# Patient Record
Sex: FEMALE | Race: OTHER | HISPANIC OR LATINO
[De-identification: names, ages, dates, MRNs, and addresses within clinical notes are randomized per-mention and may not be internally consistent; named-entity substitution may affect disease eponyms.]

---

## 2019-09-10 ENCOUNTER — TRANSCRIPTION ENCOUNTER (OUTPATIENT)
Age: 34
End: 2019-09-10

## 2019-09-11 ENCOUNTER — INPATIENT (INPATIENT)
Facility: HOSPITAL | Age: 34
LOS: 2 days | Discharge: HOME | DRG: 854 | End: 2019-09-14
Attending: SURGERY | Admitting: STUDENT IN AN ORGANIZED HEALTH CARE EDUCATION/TRAINING PROGRAM
Payer: SELF-PAY

## 2019-09-11 ENCOUNTER — RESULT REVIEW (OUTPATIENT)
Age: 34
End: 2019-09-11

## 2019-09-11 VITALS
DIASTOLIC BLOOD PRESSURE: 64 MMHG | TEMPERATURE: 98 F | RESPIRATION RATE: 20 BRPM | HEART RATE: 120 BPM | SYSTOLIC BLOOD PRESSURE: 105 MMHG | WEIGHT: 175.05 LBS | HEIGHT: 61 IN | OXYGEN SATURATION: 100 %

## 2019-09-11 DIAGNOSIS — K37 UNSPECIFIED APPENDICITIS: ICD-10-CM

## 2019-09-11 LAB
ALBUMIN SERPL ELPH-MCNC: 4.5 G/DL — SIGNIFICANT CHANGE UP (ref 3.3–5.2)
ALP SERPL-CCNC: 67 U/L — SIGNIFICANT CHANGE UP (ref 40–120)
ALT FLD-CCNC: 14 U/L — SIGNIFICANT CHANGE UP
ANION GAP SERPL CALC-SCNC: 15 MMOL/L — SIGNIFICANT CHANGE UP (ref 5–17)
ANISOCYTOSIS BLD QL: SIGNIFICANT CHANGE UP
APPEARANCE UR: CLEAR — SIGNIFICANT CHANGE UP
AST SERPL-CCNC: 16 U/L — SIGNIFICANT CHANGE UP
BACTERIA # UR AUTO: NEGATIVE — SIGNIFICANT CHANGE UP
BASOPHILS # BLD AUTO: 0 K/UL — SIGNIFICANT CHANGE UP (ref 0–0.2)
BASOPHILS NFR BLD AUTO: 0 % — SIGNIFICANT CHANGE UP (ref 0–2)
BILIRUB SERPL-MCNC: 0.4 MG/DL — SIGNIFICANT CHANGE UP (ref 0.4–2)
BILIRUB UR-MCNC: NEGATIVE — SIGNIFICANT CHANGE UP
BLD GP AB SCN SERPL QL: SIGNIFICANT CHANGE UP
BUN SERPL-MCNC: 9 MG/DL — SIGNIFICANT CHANGE UP (ref 8–20)
CALCIUM SERPL-MCNC: 9 MG/DL — SIGNIFICANT CHANGE UP (ref 8.6–10.2)
CHLORIDE SERPL-SCNC: 101 MMOL/L — SIGNIFICANT CHANGE UP (ref 98–107)
CO2 SERPL-SCNC: 21 MMOL/L — LOW (ref 22–29)
COLOR SPEC: YELLOW — SIGNIFICANT CHANGE UP
CREAT SERPL-MCNC: 0.5 MG/DL — SIGNIFICANT CHANGE UP (ref 0.5–1.3)
DACRYOCYTES BLD QL SMEAR: SLIGHT — SIGNIFICANT CHANGE UP
DIFF PNL FLD: NEGATIVE — SIGNIFICANT CHANGE UP
ELLIPTOCYTES BLD QL SMEAR: SLIGHT — SIGNIFICANT CHANGE UP
EOSINOPHIL # BLD AUTO: 0 K/UL — SIGNIFICANT CHANGE UP (ref 0–0.5)
EOSINOPHIL NFR BLD AUTO: 0 % — SIGNIFICANT CHANGE UP (ref 0–6)
EPI CELLS # UR: SIGNIFICANT CHANGE UP
GIANT PLATELETS BLD QL SMEAR: PRESENT — SIGNIFICANT CHANGE UP
GLUCOSE SERPL-MCNC: 122 MG/DL — HIGH (ref 70–115)
GLUCOSE UR QL: NEGATIVE MG/DL — SIGNIFICANT CHANGE UP
HCG SERPL-ACNC: <4 MIU/ML — SIGNIFICANT CHANGE UP
HCT VFR BLD CALC: 30.9 % — LOW (ref 34.5–45)
HGB BLD-MCNC: 8.7 G/DL — LOW (ref 11.5–15.5)
HYPOCHROMIA BLD QL: SIGNIFICANT CHANGE UP
KETONES UR-MCNC: ABNORMAL
LEUKOCYTE ESTERASE UR-ACNC: ABNORMAL
LIDOCAIN IGE QN: 25 U/L — SIGNIFICANT CHANGE UP (ref 22–51)
LYMPHOCYTES # BLD AUTO: 1.46 K/UL — SIGNIFICANT CHANGE UP (ref 1–3.3)
LYMPHOCYTES # BLD AUTO: 7.8 % — LOW (ref 13–44)
MANUAL SMEAR VERIFICATION: SIGNIFICANT CHANGE UP
MCHC RBC-ENTMCNC: 17.2 PG — LOW (ref 27–34)
MCHC RBC-ENTMCNC: 28.2 GM/DL — LOW (ref 32–36)
MCV RBC AUTO: 61.1 FL — LOW (ref 80–100)
MICROCYTES BLD QL: SIGNIFICANT CHANGE UP
MONOCYTES # BLD AUTO: 0.17 K/UL — SIGNIFICANT CHANGE UP (ref 0–0.9)
MONOCYTES NFR BLD AUTO: 0.9 % — LOW (ref 2–14)
MYELOCYTES NFR BLD: 0.9 % — HIGH (ref 0–0)
NEUTROPHILS # BLD AUTO: 16.94 K/UL — HIGH (ref 1.8–7.4)
NEUTROPHILS NFR BLD AUTO: 89.5 % — HIGH (ref 43–77)
NEUTS BAND # BLD: 0.9 % — SIGNIFICANT CHANGE UP (ref 0–8)
NITRITE UR-MCNC: NEGATIVE — SIGNIFICANT CHANGE UP
OVALOCYTES BLD QL SMEAR: SLIGHT — SIGNIFICANT CHANGE UP
PH UR: 6.5 — SIGNIFICANT CHANGE UP (ref 5–8)
PLAT MORPH BLD: ABNORMAL
PLATELET # BLD AUTO: 282 K/UL — SIGNIFICANT CHANGE UP (ref 150–400)
POIKILOCYTOSIS BLD QL AUTO: SLIGHT — SIGNIFICANT CHANGE UP
POLYCHROMASIA BLD QL SMEAR: SLIGHT — SIGNIFICANT CHANGE UP
POTASSIUM SERPL-MCNC: 3.7 MMOL/L — SIGNIFICANT CHANGE UP (ref 3.5–5.3)
POTASSIUM SERPL-SCNC: 3.7 MMOL/L — SIGNIFICANT CHANGE UP (ref 3.5–5.3)
PROT SERPL-MCNC: 7.9 G/DL — SIGNIFICANT CHANGE UP (ref 6.6–8.7)
PROT UR-MCNC: 30 MG/DL
RBC # BLD: 5.06 M/UL — SIGNIFICANT CHANGE UP (ref 3.8–5.2)
RBC # FLD: 19.4 % — HIGH (ref 10.3–14.5)
RBC BLD AUTO: ABNORMAL
RBC CASTS # UR COMP ASSIST: SIGNIFICANT CHANGE UP /HPF (ref 0–4)
SCHISTOCYTES BLD QL AUTO: SLIGHT — SIGNIFICANT CHANGE UP
SODIUM SERPL-SCNC: 137 MMOL/L — SIGNIFICANT CHANGE UP (ref 135–145)
SP GR SPEC: 1.01 — SIGNIFICANT CHANGE UP (ref 1.01–1.02)
UROBILINOGEN FLD QL: 1 MG/DL
WBC # BLD: 18.74 K/UL — HIGH (ref 3.8–10.5)
WBC # FLD AUTO: 18.74 K/UL — HIGH (ref 3.8–10.5)
WBC UR QL: SIGNIFICANT CHANGE UP

## 2019-09-11 PROCEDURE — 99285 EMERGENCY DEPT VISIT HI MDM: CPT

## 2019-09-11 PROCEDURE — 88304 TISSUE EXAM BY PATHOLOGIST: CPT | Mod: 26

## 2019-09-11 PROCEDURE — 74177 CT ABD & PELVIS W/CONTRAST: CPT | Mod: 26

## 2019-09-11 PROCEDURE — 71045 X-RAY EXAM CHEST 1 VIEW: CPT | Mod: 26

## 2019-09-11 PROCEDURE — 76705 ECHO EXAM OF ABDOMEN: CPT | Mod: 26

## 2019-09-11 PROCEDURE — 44970 LAPAROSCOPY APPENDECTOMY: CPT

## 2019-09-11 PROCEDURE — 99222 1ST HOSP IP/OBS MODERATE 55: CPT

## 2019-09-11 RX ORDER — PIPERACILLIN AND TAZOBACTAM 4; .5 G/20ML; G/20ML
3.38 INJECTION, POWDER, LYOPHILIZED, FOR SOLUTION INTRAVENOUS ONCE
Refills: 0 | Status: COMPLETED | OUTPATIENT
Start: 2019-09-11 | End: 2019-09-11

## 2019-09-11 RX ORDER — PIPERACILLIN AND TAZOBACTAM 4; .5 G/20ML; G/20ML
3.38 INJECTION, POWDER, LYOPHILIZED, FOR SOLUTION INTRAVENOUS ONCE
Refills: 0 | Status: DISCONTINUED | OUTPATIENT
Start: 2019-09-11 | End: 2019-09-11

## 2019-09-11 RX ORDER — BENZOCAINE AND MENTHOL 5; 1 G/100ML; G/100ML
1 LIQUID ORAL ONCE
Refills: 0 | Status: COMPLETED | OUTPATIENT
Start: 2019-09-11 | End: 2019-09-11

## 2019-09-11 RX ORDER — MORPHINE SULFATE 50 MG/1
4 CAPSULE, EXTENDED RELEASE ORAL ONCE
Refills: 0 | Status: DISCONTINUED | OUTPATIENT
Start: 2019-09-11 | End: 2019-09-11

## 2019-09-11 RX ORDER — SODIUM CHLORIDE 9 MG/ML
1000 INJECTION, SOLUTION INTRAVENOUS ONCE
Refills: 0 | Status: COMPLETED | OUTPATIENT
Start: 2019-09-11 | End: 2019-09-11

## 2019-09-11 RX ORDER — MORPHINE SULFATE 50 MG/1
2 CAPSULE, EXTENDED RELEASE ORAL EVERY 4 HOURS
Refills: 0 | Status: DISCONTINUED | OUTPATIENT
Start: 2019-09-11 | End: 2019-09-11

## 2019-09-11 RX ORDER — SODIUM CHLORIDE 9 MG/ML
1000 INJECTION INTRAMUSCULAR; INTRAVENOUS; SUBCUTANEOUS ONCE
Refills: 0 | Status: COMPLETED | OUTPATIENT
Start: 2019-09-11 | End: 2019-09-11

## 2019-09-11 RX ORDER — ACETAMINOPHEN 500 MG
1000 TABLET ORAL ONCE
Refills: 0 | Status: COMPLETED | OUTPATIENT
Start: 2019-09-11 | End: 2019-09-11

## 2019-09-11 RX ORDER — OXYCODONE HYDROCHLORIDE 5 MG/1
5 TABLET ORAL EVERY 6 HOURS
Refills: 0 | Status: DISCONTINUED | OUTPATIENT
Start: 2019-09-11 | End: 2019-09-14

## 2019-09-11 RX ORDER — SODIUM CHLORIDE 9 MG/ML
1000 INJECTION, SOLUTION INTRAVENOUS
Refills: 0 | Status: DISCONTINUED | OUTPATIENT
Start: 2019-09-11 | End: 2019-09-11

## 2019-09-11 RX ORDER — ONDANSETRON 8 MG/1
4 TABLET, FILM COATED ORAL ONCE
Refills: 0 | Status: DISCONTINUED | OUTPATIENT
Start: 2019-09-11 | End: 2019-09-11

## 2019-09-11 RX ORDER — ENOXAPARIN SODIUM 100 MG/ML
40 INJECTION SUBCUTANEOUS DAILY
Refills: 0 | Status: CANCELLED | OUTPATIENT
Start: 2019-09-12 | End: 2019-09-11

## 2019-09-11 RX ORDER — FENTANYL CITRATE 50 UG/ML
50 INJECTION INTRAVENOUS
Refills: 0 | Status: DISCONTINUED | OUTPATIENT
Start: 2019-09-11 | End: 2019-09-11

## 2019-09-11 RX ORDER — ONDANSETRON 8 MG/1
4 TABLET, FILM COATED ORAL ONCE
Refills: 0 | Status: COMPLETED | OUTPATIENT
Start: 2019-09-11 | End: 2019-09-11

## 2019-09-11 RX ORDER — PIPERACILLIN AND TAZOBACTAM 4; .5 G/20ML; G/20ML
3.38 INJECTION, POWDER, LYOPHILIZED, FOR SOLUTION INTRAVENOUS EVERY 8 HOURS
Refills: 0 | Status: DISCONTINUED | OUTPATIENT
Start: 2019-09-11 | End: 2019-09-11

## 2019-09-11 RX ORDER — ACETAMINOPHEN 500 MG
650 TABLET ORAL EVERY 6 HOURS
Refills: 0 | Status: DISCONTINUED | OUTPATIENT
Start: 2019-09-11 | End: 2019-09-14

## 2019-09-11 RX ORDER — KETOROLAC TROMETHAMINE 30 MG/ML
30 SYRINGE (ML) INJECTION EVERY 8 HOURS
Refills: 0 | Status: DISCONTINUED | OUTPATIENT
Start: 2019-09-11 | End: 2019-09-12

## 2019-09-11 RX ORDER — HEPARIN SODIUM 5000 [USP'U]/ML
5000 INJECTION INTRAVENOUS; SUBCUTANEOUS EVERY 8 HOURS
Refills: 0 | Status: DISCONTINUED | OUTPATIENT
Start: 2019-09-12 | End: 2019-09-14

## 2019-09-11 RX ADMIN — SODIUM CHLORIDE 1000 MILLILITER(S): 9 INJECTION INTRAMUSCULAR; INTRAVENOUS; SUBCUTANEOUS at 09:26

## 2019-09-11 RX ADMIN — OXYCODONE HYDROCHLORIDE 5 MILLIGRAM(S): 5 TABLET ORAL at 22:52

## 2019-09-11 RX ADMIN — MORPHINE SULFATE 4 MILLIGRAM(S): 50 CAPSULE, EXTENDED RELEASE ORAL at 09:26

## 2019-09-11 RX ADMIN — OXYCODONE HYDROCHLORIDE 5 MILLIGRAM(S): 5 TABLET ORAL at 23:22

## 2019-09-11 RX ADMIN — MORPHINE SULFATE 2 MILLIGRAM(S): 50 CAPSULE, EXTENDED RELEASE ORAL at 17:00

## 2019-09-11 RX ADMIN — MORPHINE SULFATE 2 MILLIGRAM(S): 50 CAPSULE, EXTENDED RELEASE ORAL at 16:25

## 2019-09-11 RX ADMIN — SODIUM CHLORIDE 2000 MILLILITER(S): 9 INJECTION, SOLUTION INTRAVENOUS at 16:26

## 2019-09-11 RX ADMIN — ONDANSETRON 4 MILLIGRAM(S): 8 TABLET, FILM COATED ORAL at 09:25

## 2019-09-11 RX ADMIN — Medication 650 MILLIGRAM(S): at 23:23

## 2019-09-11 RX ADMIN — Medication 30 MILLIGRAM(S): at 22:07

## 2019-09-11 RX ADMIN — SODIUM CHLORIDE 1000 MILLILITER(S): 9 INJECTION INTRAMUSCULAR; INTRAVENOUS; SUBCUTANEOUS at 10:26

## 2019-09-11 RX ADMIN — Medication 400 MILLIGRAM(S): at 15:25

## 2019-09-11 RX ADMIN — Medication 650 MILLIGRAM(S): at 22:53

## 2019-09-11 RX ADMIN — BENZOCAINE AND MENTHOL 1 LOZENGE: 5; 1 LIQUID ORAL at 23:24

## 2019-09-11 RX ADMIN — PIPERACILLIN AND TAZOBACTAM 200 GRAM(S): 4; .5 INJECTION, POWDER, LYOPHILIZED, FOR SOLUTION INTRAVENOUS at 15:25

## 2019-09-11 NOTE — ED STATDOCS - ATTENDING CONTRIBUTION TO CARE
I, Say Leon, performed the initial face to face bedside interview with this patient regarding history of present illness, review of symptoms and relevant past medical, social and family history.  I completed an independent physical examination.  I was the initial provider who evaluated this patient. I have signed out the follow up of any pending tests (i.e. labs, radiological studies) to the ACP.  I have communicated the patient’s plan of care and disposition with the ACP.  The history, relevant review of systems, past medical and surgical history, medical decision making, and physical examination was documented by the scribe in my presence and I attest to the accuracy of the documentation.

## 2019-09-11 NOTE — H&P ADULT - ASSESSMENT
33yo female with acute appendicitis. Concern for possible acute cholecystitis? biliary colic? symptomatich cholelithiasis?  - Admit to ACS  - NPO/IVF's  - Zosyn IV abx  - OR add on

## 2019-09-11 NOTE — H&P ADULT - ATTENDING COMMENTS
one day of abd pain woke her up at 0400, +n/v no fever . pain to epigastrum ruq / rlq / suprapubic .  abd soft ttp to ruq, rlq, suprapubic.   labs reviewed wbc 18K, lft's wnl  ct reviewed  acute appendicitis , u/s gb with gs no dilation , no gbw thickening but does have a dilated cbd 8mm.  plan  admit to acs  start zosyn  d/w pt risks benefits alternatives for appendectomy , she agrees for surgery.  plan for surgery today.

## 2019-09-11 NOTE — H&P ADULT - NSHPPHYSICALEXAM_GEN_ALL_CORE
PHYSICAL EXAM:  GENERAL: NAD, lying in bed comfortably  EYES: EOMI, PERRLA  CHEST/LUNG: Clear to auscultation bilaterally  HEART: Regular rate and rhythm  ABDOMEN: soft, exquisitely tender epigastric, RUQ and RLQ, rebound tenderness to RLQ, (+) Pena  EXTREMITIES:  2+ Peripheral Pulses, brisk capillary refill  NERVOUS SYSTEM:  Alert & Oriented X3  MSK: FROM all 4 extremities, full and equal strength

## 2019-09-11 NOTE — PROGRESS NOTE ADULT - ASSESSMENT
Otherwise healthy 34 F with acute appendicitics possible concurrent cholecystitis who is now s/p lap appy 9/10. Tolerated surgery well, doing well post-operatively.    -Continue Pain control  - DASH diet  - SQH  - AM labs  - no ABx at this time, assess for concurrent cholecystitis based on clinical presentation  - SQH

## 2019-09-11 NOTE — H&P ADULT - NSHPLABSRESULTS_GEN_ALL_CORE
< from: US Gallbladder (09.11.19 @ 10:40) >   EXAM:  US GALLBLADDER                        PROCEDURE DATE:  09/11/2019      INTERPRETATION:  US GALLBLADDER    History: Right upper quadrant pain.    Technique: Ultrasound of the gallbladder.    Comparison:  None.    Findings:    There is cholelithiasis. Gallbladder wall borderline caliber (3-4 mm). No   pericholecystic fluid. Patient tender over the gallbladder at the time of   the study.    Dilated common bile duct measuring 8-9 mm.    Impression:  Cholelithiasis; patient tender over the gallbladder at the time of the   study; clinical correlation is recommended for cholecystitis; a HIDA scan   could be obtained for further evaluation.  Dilated common bile duct measuring 8-9 mm; a noncontrast MRI/MRCP is   recommended for further evaluation.    ERIBERTO BRYAN   This document has been electronically signed. Sep 11 2019 10:54AM  < end of copied text >    < from: CT Abdomen and Pelvis w/ IV Cont (09.11.19 @ 12:54) >     EXAM:  CT ABDOMEN AND PELVIS IC                          PROCEDURE DATE:  09/11/2019      INTERPRETATION:  CLINICAL INFORMATION: Abdominal pain.    COMPARISON: Gallbladder ultrasound of the same day.    PROCEDURE:   CT of the Abdomen and Pelvis was performed with intravenous contrast.   Intravenous contrast: 93 ml Omnipaque 300.  Oral contrast: None.  Sagittal and coronal reformats were performed.    FINDINGS:    LOWER CHEST: Bibasilar opacities, likely atelectasis.    LIVER: Low in attenuation, likely fatty infiltration.  BILE DUCTS: Dilated common bile duct measuring 9 mm.  GALLBLADDER: Mildly distended. Normal caliber wall. No pericholecystic   inflammatory change. Gallstones on the ultrasound of the same day are not   seen on CT.  SPLEEN: Within normal limits.  PANCREAS: Within normal limits.  ADRENALS: Within normal limits.  KIDNEYS/URETERS: Within normal limits.    BLADDER: Within normal limits.  REPRODUCTIVE ORGANS: Uterus and adnexa within normal limits.    BOWEL: No bowel obstruction. Dilated appendix measuring 1.2 cm with   infiltration of the fat adjacent to the distal appendix consistent with   appendicitis.  PERITONEUM: Small amount of fluid/infiltration of the fat adjacent to the   appendix. No free air. No abscess or free air.  VESSELS: Abdominal aorta normal in caliber. Retroaortic left renal vein,   an anatomic variant.  RETROPERITONEUM/LYMPH NODES: No lymphadenopathy.    ABDOMINAL WALL: Within normal limits.  BONES: Sclerosis involving both sacroiliac joints.    IMPRESSION:     Appendicitis.    Mildly dilated common bile duct measuring 9 mm; correlation is   recommended with LFTs; a noncontrast MRI/MRCP of the abdomen is   recommended for further evaluation.    The findings were discussed with Dr. Leon at 1:12 PM on 9/11/2019.    ERIBERTO BRYAN   This document has been electronically signed. Sep 11 2019  1:14  < end of copied text >

## 2019-09-11 NOTE — ED STATDOCS - PROGRESS NOTE DETAILS
AMARILIS HERNANDEZ: PT evaluated by intake physician. HPI/PE/ROS as noted above. Will follow up plan per intake physician   diffuse abdominal pain. diffusely ttp.   pending ct appendicitis on ct with gallstones and dilated cbd   surgery consulted

## 2019-09-11 NOTE — H&P ADULT - HISTORY OF PRESENT ILLNESS
35yo female presents to the ED with complaint of epigastric pain that radiated to her RUQ and RLQ. Pain started around 4am today, sharp, constant, no aggravating or alleviating factors. had some associated nausea with x3 emesis NBNB. Last BM was yesterday, has lost appetitis and not eaten since yesteray. Currently, denies chest pain, SOB, fevers, chills, dysuria.

## 2019-09-11 NOTE — ED ADULT NURSE REASSESSMENT NOTE - NS ED NURSE REASSESS COMMENT FT1
Upon reassessment, pt found to be afebrile  an tachycardic.  respirations even and unlabored, no signs of acute distress.  ACS/trauma made aware of pts fever/tachycardia, will order IVF and tylenol.

## 2019-09-11 NOTE — PROGRESS NOTE ADULT - SUBJECTIVE AND OBJECTIVE BOX
INTERVAL HPI/OVERNIGHT EVENTS:    S/p Lap Appy . tolerated surgery well, recovering well on the floors. No complaints at this time. pain is decreased from before. Tolerating diet. urinating on her own. No f/c, cp,sob    MEDICATIONS  (STANDING):  acetaminophen   Tablet .. 650 milliGRAM(s) Oral every 6 hours  ketorolac   Injectable 30 milliGRAM(s) IV Push every 8 hours    MEDICATIONS  (PRN):  oxyCODONE    IR 5 milliGRAM(s) Oral every 6 hours PRN Severe Pain (7 - 10)      Vital Signs Last 24 Hrs  T(C): 37.1 (11 Sep 2019 23:23), Max: 38.7 (11 Sep 2019 14:59)  T(F): 98.8 (11 Sep 2019 23:23), Max: 101.6 (11 Sep 2019 14:59)  HR: 116 (11 Sep 2019 23:23) (95 - 132)  BP: 100/66 (11 Sep 2019 23:23) (96/61 - 116/70)  BP(mean): --  RR: 18 (11 Sep 2019 23:23) (14 - 20)  SpO2: 95% (11 Sep 2019 23:23) (95% - 100%)    Physical Exam:    Neurological:  No sensory/motor deficits    HEENT: PERRLA, EOMI, no drainage or redness    Respiratory: Breath Sounds equal & clear to auscultation, no accessory muscle use    Cardiovascular: Regular rate & rhythm, normal S1, S2; no murmurs, gallops or rubs    Gastrointestinal: Soft, appropriately tender, non-peritonitic, surgical sites c/d/i    Extremities: No peripheral edema, No cyanosis, clubbing     Vascular: Equal and normal pulses: 2+ peripheral pulses throughout    Musculoskeletal: No joint pain, swelling or deformity; no limitation of movement    Skin: No rashes      I&O's Detail    11 Sep 2019 07:01  -  11 Sep 2019 23:53  --------------------------------------------------------  IN:    lactated ringers.: 50 mL  Total IN: 50 mL    OUT:    Voided: 425 mL  Total OUT: 425 mL    Total NET: -375 mL          LABS:                        8.7    18.74 )-----------( 282      ( 11 Sep 2019 09:47 )             30.9         137  |  101  |  9.0  ----------------------------<  122<H>  3.7   |  21.0<L>  |  0.50    Ca    9.0      11 Sep 2019 09:47    TPro  7.9  /  Alb  4.5  /  TBili  0.4  /  DBili  x   /  AST  16  /  ALT  14  /  AlkPhos  67  09-11      Urinalysis Basic - ( 11 Sep 2019 10:11 )    Color: Yellow / Appearance: Clear / S.010 / pH: x  Gluc: x / Ketone: Trace  / Bili: Negative / Urobili: 1 mg/dL   Blood: x / Protein: 30 mg/dL / Nitrite: Negative   Leuk Esterase: Trace / RBC: 0-2 /HPF / WBC 3-5   Sq Epi: x / Non Sq Epi: Occasional / Bacteria: Negative        RADIOLOGY & ADDITIONAL STUDIES:

## 2019-09-11 NOTE — ED STATDOCS - OBJECTIVE STATEMENT
33 y/o F pt with no PMHx presents to the ED c/o abdominal pain. Abdominal pain is described as diffuse and crampy. Starting at epigastric and radiating to generalization. Nausea, vomiting, no diarrhea. No recent travel or sick contacts. Denies CP, SOB.

## 2019-09-12 ENCOUNTER — TRANSCRIPTION ENCOUNTER (OUTPATIENT)
Age: 34
End: 2019-09-12

## 2019-09-12 LAB
ABO RH CONFIRMATION: SIGNIFICANT CHANGE UP
ANION GAP SERPL CALC-SCNC: 10 MMOL/L — SIGNIFICANT CHANGE UP (ref 5–17)
BASOPHILS # BLD AUTO: 0.02 K/UL — SIGNIFICANT CHANGE UP (ref 0–0.2)
BASOPHILS NFR BLD AUTO: 0.1 % — SIGNIFICANT CHANGE UP (ref 0–2)
BUN SERPL-MCNC: 6 MG/DL — LOW (ref 8–20)
CALCIUM SERPL-MCNC: 8.5 MG/DL — LOW (ref 8.6–10.2)
CHLORIDE SERPL-SCNC: 105 MMOL/L — SIGNIFICANT CHANGE UP (ref 98–107)
CO2 SERPL-SCNC: 23 MMOL/L — SIGNIFICANT CHANGE UP (ref 22–29)
CREAT SERPL-MCNC: 0.4 MG/DL — LOW (ref 0.5–1.3)
EOSINOPHIL # BLD AUTO: 0 K/UL — SIGNIFICANT CHANGE UP (ref 0–0.5)
EOSINOPHIL NFR BLD AUTO: 0 % — SIGNIFICANT CHANGE UP (ref 0–6)
GLUCOSE SERPL-MCNC: 136 MG/DL — HIGH (ref 70–115)
HCT VFR BLD CALC: 23.2 % — LOW (ref 34.5–45)
HCT VFR BLD CALC: 24.5 % — LOW (ref 34.5–45)
HCT VFR BLD CALC: 26.2 % — LOW (ref 34.5–45)
HGB BLD-MCNC: 6.6 G/DL — CRITICAL LOW (ref 11.5–15.5)
HGB BLD-MCNC: 7 G/DL — CRITICAL LOW (ref 11.5–15.5)
HGB BLD-MCNC: 7.6 G/DL — LOW (ref 11.5–15.5)
IMM GRANULOCYTES NFR BLD AUTO: 0.8 % — SIGNIFICANT CHANGE UP (ref 0–1.5)
LYMPHOCYTES # BLD AUTO: 1.39 K/UL — SIGNIFICANT CHANGE UP (ref 1–3.3)
LYMPHOCYTES # BLD AUTO: 7 % — LOW (ref 13–44)
MAGNESIUM SERPL-MCNC: 1.8 MG/DL — SIGNIFICANT CHANGE UP (ref 1.6–2.6)
MCHC RBC-ENTMCNC: 17.1 PG — LOW (ref 27–34)
MCHC RBC-ENTMCNC: 17.2 PG — LOW (ref 27–34)
MCHC RBC-ENTMCNC: 18.2 PG — LOW (ref 27–34)
MCHC RBC-ENTMCNC: 28.4 GM/DL — LOW (ref 32–36)
MCHC RBC-ENTMCNC: 28.6 GM/DL — LOW (ref 32–36)
MCHC RBC-ENTMCNC: 29 GM/DL — LOW (ref 32–36)
MCV RBC AUTO: 59.9 FL — LOW (ref 80–100)
MCV RBC AUTO: 60 FL — LOW (ref 80–100)
MCV RBC AUTO: 62.7 FL — LOW (ref 80–100)
MONOCYTES # BLD AUTO: 1.07 K/UL — HIGH (ref 0–0.9)
MONOCYTES NFR BLD AUTO: 5.4 % — SIGNIFICANT CHANGE UP (ref 2–14)
NEUTROPHILS # BLD AUTO: 17.25 K/UL — HIGH (ref 1.8–7.4)
NEUTROPHILS NFR BLD AUTO: 86.7 % — HIGH (ref 43–77)
PHOSPHATE SERPL-MCNC: 2.4 MG/DL — SIGNIFICANT CHANGE UP (ref 2.4–4.7)
PLATELET # BLD AUTO: 228 K/UL — SIGNIFICANT CHANGE UP (ref 150–400)
PLATELET # BLD AUTO: 231 K/UL — SIGNIFICANT CHANGE UP (ref 150–400)
PLATELET # BLD AUTO: 242 K/UL — SIGNIFICANT CHANGE UP (ref 150–400)
POTASSIUM SERPL-MCNC: 3.5 MMOL/L — SIGNIFICANT CHANGE UP (ref 3.5–5.3)
POTASSIUM SERPL-SCNC: 3.5 MMOL/L — SIGNIFICANT CHANGE UP (ref 3.5–5.3)
RBC # BLD: 3.87 M/UL — SIGNIFICANT CHANGE UP (ref 3.8–5.2)
RBC # BLD: 4.08 M/UL — SIGNIFICANT CHANGE UP (ref 3.8–5.2)
RBC # BLD: 4.18 M/UL — SIGNIFICANT CHANGE UP (ref 3.8–5.2)
RBC # FLD: 19.1 % — HIGH (ref 10.3–14.5)
RBC # FLD: 19.2 % — HIGH (ref 10.3–14.5)
RBC # FLD: 21 % — HIGH (ref 10.3–14.5)
SODIUM SERPL-SCNC: 138 MMOL/L — SIGNIFICANT CHANGE UP (ref 135–145)
WBC # BLD: 18.15 K/UL — HIGH (ref 3.8–10.5)
WBC # BLD: 19.18 K/UL — HIGH (ref 3.8–10.5)
WBC # BLD: 19.89 K/UL — HIGH (ref 3.8–10.5)
WBC # FLD AUTO: 18.15 K/UL — HIGH (ref 3.8–10.5)
WBC # FLD AUTO: 19.18 K/UL — HIGH (ref 3.8–10.5)
WBC # FLD AUTO: 19.89 K/UL — HIGH (ref 3.8–10.5)

## 2019-09-12 PROCEDURE — 93010 ELECTROCARDIOGRAM REPORT: CPT

## 2019-09-12 RX ORDER — SODIUM,POTASSIUM PHOSPHATES 278-250MG
1 POWDER IN PACKET (EA) ORAL
Refills: 0 | Status: COMPLETED | OUTPATIENT
Start: 2019-09-12 | End: 2019-09-12

## 2019-09-12 RX ORDER — MAGNESIUM OXIDE 400 MG ORAL TABLET 241.3 MG
400 TABLET ORAL ONCE
Refills: 0 | Status: COMPLETED | OUTPATIENT
Start: 2019-09-12 | End: 2019-09-12

## 2019-09-12 RX ORDER — SODIUM CHLORIDE 9 MG/ML
1000 INJECTION, SOLUTION INTRAVENOUS ONCE
Refills: 0 | Status: COMPLETED | OUTPATIENT
Start: 2019-09-12 | End: 2019-09-12

## 2019-09-12 RX ORDER — INFLUENZA VIRUS VACCINE 15; 15; 15; 15 UG/.5ML; UG/.5ML; UG/.5ML; UG/.5ML
0.5 SUSPENSION INTRAMUSCULAR ONCE
Refills: 0 | Status: COMPLETED | OUTPATIENT
Start: 2019-09-12 | End: 2019-09-12

## 2019-09-12 RX ORDER — CEFEPIME 1 G/1
2000 INJECTION, POWDER, FOR SOLUTION INTRAMUSCULAR; INTRAVENOUS EVERY 12 HOURS
Refills: 0 | Status: COMPLETED | OUTPATIENT
Start: 2019-09-12 | End: 2019-09-13

## 2019-09-12 RX ADMIN — Medication 30 MILLIGRAM(S): at 14:40

## 2019-09-12 RX ADMIN — OXYCODONE HYDROCHLORIDE 5 MILLIGRAM(S): 5 TABLET ORAL at 11:40

## 2019-09-12 RX ADMIN — MAGNESIUM OXIDE 400 MG ORAL TABLET 400 MILLIGRAM(S): 241.3 TABLET ORAL at 14:27

## 2019-09-12 RX ADMIN — CEFEPIME 100 MILLIGRAM(S): 1 INJECTION, POWDER, FOR SOLUTION INTRAMUSCULAR; INTRAVENOUS at 17:26

## 2019-09-12 RX ADMIN — Medication 30 MILLIGRAM(S): at 05:10

## 2019-09-12 RX ADMIN — Medication 1 PACKET(S): at 14:28

## 2019-09-12 RX ADMIN — Medication 30 MILLIGRAM(S): at 14:27

## 2019-09-12 RX ADMIN — HEPARIN SODIUM 5000 UNIT(S): 5000 INJECTION INTRAVENOUS; SUBCUTANEOUS at 14:27

## 2019-09-12 RX ADMIN — HEPARIN SODIUM 5000 UNIT(S): 5000 INJECTION INTRAVENOUS; SUBCUTANEOUS at 21:08

## 2019-09-12 RX ADMIN — Medication 650 MILLIGRAM(S): at 17:53

## 2019-09-12 RX ADMIN — Medication 650 MILLIGRAM(S): at 17:26

## 2019-09-12 RX ADMIN — Medication 30 MILLIGRAM(S): at 21:23

## 2019-09-12 RX ADMIN — Medication 650 MILLIGRAM(S): at 05:10

## 2019-09-12 RX ADMIN — HEPARIN SODIUM 5000 UNIT(S): 5000 INJECTION INTRAVENOUS; SUBCUTANEOUS at 05:10

## 2019-09-12 RX ADMIN — Medication 650 MILLIGRAM(S): at 12:04

## 2019-09-12 RX ADMIN — Medication 1 PACKET(S): at 12:02

## 2019-09-12 RX ADMIN — Medication 30 MILLIGRAM(S): at 05:40

## 2019-09-12 RX ADMIN — Medication 650 MILLIGRAM(S): at 11:40

## 2019-09-12 RX ADMIN — OXYCODONE HYDROCHLORIDE 5 MILLIGRAM(S): 5 TABLET ORAL at 12:04

## 2019-09-12 RX ADMIN — Medication 1 PACKET(S): at 17:26

## 2019-09-12 RX ADMIN — Medication 650 MILLIGRAM(S): at 05:40

## 2019-09-12 RX ADMIN — SODIUM CHLORIDE 1000 MILLILITER(S): 9 INJECTION, SOLUTION INTRAVENOUS at 07:00

## 2019-09-12 RX ADMIN — Medication 30 MILLIGRAM(S): at 21:08

## 2019-09-12 NOTE — DISCHARGE NOTE PROVIDER - HOSPITAL COURSE
Admission HPI: 33yo female presents to the ED with complaint of epigastric pain that radiated to her RUQ and RLQ. Pain started around 4am today, sharp, constant, no aggravating or alleviating factors. had some associated nausea with x3 emesis NBNB. Last BM was yesterday, has lost appetite and not eaten since yesterday. Currently, denies chest pain, SOB, fevers, chills, dysuria.         Hospital Course: GB ultrasound on admission showed cholelithiasis; patient tender over the gallbladder at the time of the study. CT AP showed appendicitis w/ mildly dilated common bile duct measuring 9 mm. LFTs on admission WNL. Patient was admitted to the ACS service & taken to the OR on 9/11 - intraop findings = acutely iflammed appendix w/ surrounding inflammation and purulence. Patient tolerated procedure well. Post-op tolerating diet, pain controlled, ambulating & voiding.        Patient is advised to RETURN TO THE EMERGENCY DEPARTMENT for any of the following - worsening pain, fever/chills, nausea/vomiting, altered mental status, chest pain, shortness of breath, or any other new / worsening symptom. Admission HPI: 35yo female presents to the ED with complaint of epigastric pain that radiated to her RUQ and RLQ. Pain started around 4am today, sharp, constant, no aggravating or alleviating factors. had some associated nausea with x3 emesis NBNB. Last BM was yesterday, has lost appetite and not eaten since yesterday. Currently, denies chest pain, SOB, fevers, chills, dysuria.         Hospital Course: GB ultrasound on admission showed cholelithiasis; patient tender over the gallbladder at the time of the study. CT AP showed appendicitis w/ mildly dilated common bile duct measuring 9 mm. LFTs on admission WNL. Patient was admitted to hospital & taken to the OR on 9/11 - intraop findings = acutely inflamed appendix w/ surrounding inflammation and purulence. Patient tolerated procedure well. Patient consisted to be tachycardic to the low 100's although decreased from tachycardia on admission in the 130's. B/l LE duplex performed which revealed no evidence of DVT. Family Medicine was consulted to establish a primary care physician and was recommended a f/u outpatient appointment with Dr. Ngoc Goff who is [place] speaking. Post-op tolerating diet, pain controlled, ambulating & voiding.        Patient is advised to RETURN TO THE EMERGENCY DEPARTMENT for any of the following - worsening pain, fever/chills, nausea/vomiting, altered mental status, chest pain, shortness of breath, or any other new / worsening symptom.

## 2019-09-12 NOTE — PROGRESS NOTE ADULT - SUBJECTIVE AND OBJECTIVE BOX
Progress Note:   · Provider Specialty	Surgery	    Reason for Admission:    Reason for Admission:  · Reason for Admission	Acute appendicitis	      · Subjective and Objective: 	  INTERVAL HPI/OVERNIGHT EVENTS:    S/p Lap Appy . tolerated surgery well, recovering well on the floors. No complaints at this time. pain is decreased from before. Tolerating diet. urinating on her own. No f/c, cp,sob    MEDICATIONS  (STANDING):  acetaminophen   Tablet .. 650 milliGRAM(s) Oral every 6 hours  ketorolac   Injectable 30 milliGRAM(s) IV Push every 8 hours    MEDICATIONS  (PRN):  oxyCODONE    IR 5 milliGRAM(s) Oral every 6 hours PRN Severe Pain (7 - 10)      Vital Signs Last 24 Hrs  T(C): 37.1 (11 Sep 2019 23:23), Max: 38.7 (11 Sep 2019 14:59)  T(F): 98.8 (11 Sep 2019 23:23), Max: 101.6 (11 Sep 2019 14:59)  HR: 116 (11 Sep 2019 23:23) (95 - 132)  BP: 100/66 (11 Sep 2019 23:23) (96/61 - 116/70)  BP(mean): --  RR: 18 (11 Sep 2019 23:23) (14 - 20)  SpO2: 95% (11 Sep 2019 23:23) (95% - 100%)    Physical Exam:    Neurological:  No sensory/motor deficits    HEENT: PERRLA, EOMI, no drainage or redness    Respiratory: Breath Sounds equal & clear to auscultation, no accessory muscle use    Cardiovascular: Regular rate & rhythm, normal S1, S2; no murmurs, gallops or rubs    Gastrointestinal: Soft, appropriately tender, non-peritonitic, surgical sites c/d/i    Extremities: No peripheral edema, No cyanosis, clubbing     Vascular: Equal and normal pulses: 2+ peripheral pulses throughout    Musculoskeletal: No joint pain, swelling or deformity; no limitation of movement    Skin: No rashes      I&O's Detail    11 Sep 2019 07:01  -  11 Sep 2019 23:53  --------------------------------------------------------  IN:    lactated ringers.: 50 mL  Total IN: 50 mL    OUT:    Voided: 425 mL  Total OUT: 425 mL    Total NET: -375 mL          LABS:                        8.7    18.74 )-----------( 282      ( 11 Sep 2019 09:47 )             30.9     09-    137  |  101  |  9.0  ----------------------------<  122<H>  3.7   |  21.0<L>  |  0.50    Ca    9.0      11 Sep 2019 09:47    TPro  7.9  /  Alb  4.5  /  TBili  0.4  /  DBili  x   /  AST  16  /  ALT  14  /  AlkPhos  67        Urinalysis Basic - ( 11 Sep 2019 10:11 )    Color: Yellow / Appearance: Clear / S.010 / pH: x  Gluc: x / Ketone: Trace  / Bili: Negative / Urobili: 1 mg/dL   Blood: x / Protein: 30 mg/dL / Nitrite: Negative   Leuk Esterase: Trace / RBC: 0-2 /HPF / WBC 3-5   Sq Epi: x / Non Sq Epi: Occasional / Bacteria: Negative        RADIOLOGY & ADDITIONAL STUDIES:    Assessment and Plan:   · Assessment		  Otherwise healthy 34 F with acute appendicitics possible concurrent cholecystitis who is now s/p lap appy 9/10. Tolerated surgery well, doing well post-operatively.    -Continue Pain control  - DASH diet  - SQH  - AM labs  - no ABx at this time, assess for concurrent cholecystitis based on clinical presentation  - SQ

## 2019-09-12 NOTE — DISCHARGE NOTE PROVIDER - CARE PROVIDER_API CALL
[facility],   [facility] [address] [place] [address]  Phone: [phone]  Fax: (   )    -  Follow Up Time: Ngoc solo  Phone: [phone]  Fax: (   )    -  Follow Up Time: Ngoc solo  Phone: [phone]  Fax: (   )    -  Follow Up Time:     Adam Sparks  [address] [place] [id number]  Phone: [phone]  Fax: (   )    -  Follow Up Time:

## 2019-09-12 NOTE — DISCHARGE NOTE PROVIDER - PROVIDER TOKENS
FREE:[LAST:[[facility]],PHONE:[phone]],FAX:[(   )    -],ADDRESS:[[address] [place] [address]]] FREE:[LAST:[edil],FIRST:[Ngoc],PHONE:[phone]],FAX:[(   )    -]] FREE:[LAST:[edil],FIRST:[Ngoc],PHONE:[phone]],FAX:[(   )    -]],FREE:[LAST:[Clare],FIRST:[Adam],PHONE:[phone]],FAX:[(   )    -],ADDRESS:[[address] [place] [address]]]

## 2019-09-12 NOTE — DISCHARGE NOTE PROVIDER - NSDCCPCAREPLAN_GEN_ALL_CORE_FT
PRINCIPAL DISCHARGE DIAGNOSIS  Diagnosis: Appendicitis  Assessment and Plan of Treatment: Follow up: Please call and make an appointment with the [facility] 10-14 days after discharge. Also, please call and make an appointment with your primary care physician as per your usual schedule.   Activity: May return to normal activities as tolerated, however refrain from heavy lifting > 10-15 lbs.  Diet: May continue regular diet.  Medications: Please take all home medications as prescribed by your primary care doctor. Pain medication has been prescribed for you. Please, take it as it has been prescribed, do not drive or operate heavy machinery while taking narcotics.  You are encouraged to take over-the-counter tylenol and/or ibuprofen for pain relief when you feel your pain no longer warrants the use of narcotic pain medications, however DO NOT TAKE percocet and tylenol at the same time as they contain the same active ingredient (acetaminophen). Take only percocet OR tylenol.  Wound Care: Please, keep wound site clean and dry. You may shower, but do not bathe.  Patient is advised to RETURN TO THE EMERGENCY DEPARTMENT for any of the following - worsening pain, fever/chills, nausea/vomiting, altered mental status, chest pain, shortness of breath, or any other new / worsening symptom. PRINCIPAL DISCHARGE DIAGNOSIS  Diagnosis: Appendicitis  Assessment and Plan of Treatment: Follow up: Please call and make an appointment with the Dr. Sparks 10-14 days after discharge.   Activity: May return to normal activities as tolerated, however refrain from heavy lifting > 10-15 lbs.  Diet: May continue regular diet.  Medications: Please take all home medications as prescribed by your primary care doctor. Pain medication has been prescribed for you. Please, take it as it has been prescribed, do not drive or operate heavy machinery while taking narcotics.  You are encouraged to take over-the-counter tylenol and/or ibuprofen for pain relief when you feel your pain no longer warrants the use of narcotic pain medications, however DO NOT TAKE percocet and tylenol at the same time as they contain the same active ingredient (acetaminophen). Take only percocet OR tylenol.  Wound Care: Please, keep wound site clean and dry. You may shower, but do not bathe.  Patient is advised to RETURN TO THE EMERGENCY DEPARTMENT for any of the following - worsening pain, fever/chills, nausea/vomiting, altered mental status, chest pain, shortness of breath, or any other new / worsening symptom.      SECONDARY DISCHARGE DIAGNOSES  Diagnosis: Chronic anemia  Assessment and Plan of Treatment: Follow up with Dr. Ngoc Goff as outpatient primary care provider for continued care of chronic anemia and further work-up. PRINCIPAL DISCHARGE DIAGNOSIS  Diagnosis: Appendicitis  Assessment and Plan of Treatment: Follow up: Please call and make an appointment with the Dr. Sparks 10-14 days after discharge.   Activity: May return to normal activities as tolerated, however refrain from heavy lifting > 10-15 lbs.  Diet: May continue regular diet.  Medications: Please take all home medications as prescribed by your primary care doctor. Pain medication has been prescribed for you. Please, take it as it has been prescribed, do not drive or operate heavy machinery while taking narcotics.  You are encouraged to take over-the-counter tylenol and/or ibuprofen for pain relief when you feel your pain no longer warrants the use of narcotic pain medications, however DO NOT TAKE percocet and tylenol at the same time as they contain the same active ingredient (acetaminophen). Take only percocet OR tylenol.  Wound Care: Please, keep wound site clean and dry. You may shower, but do not bathe.  Patient is advised to RETURN TO THE EMERGENCY DEPARTMENT for any of the following - worsening pain, fever/chills, nausea/vomiting, altered mental status, chest pain, shortness of breath, or any other new / worsening symptom.      SECONDARY DISCHARGE DIAGNOSES  Diagnosis: Chronic anemia  Assessment and Plan of Treatment: Follow up with Dr. Ngoc Goff as outpatient primary care provider for continued care of chronic anemia and further work-up. Ferrous Sulfate has been prescribed for you. Please take 325 mg twice daily. This medication can often cause constipation, so you were also prescribed Colace 100 mg twice daily as needed for constipation.

## 2019-09-12 NOTE — PROGRESS NOTE ADULT - ATTENDING COMMENTS
Patient feels well. Minimal pain. Reports chronic anemia and severe fatigue and daytime sleepiness, which has been present for years    Afebrile,   Soft, incisional tenderness  Hgb 6.6     Anemia is likely chronic + dilutional   Will transfuse 1 unit  If appropriate response, plan for discharge this afternoon/evening   Medicine consult to set up PCP and establish follow-up plan Patient feels well. Minimal pain. Reports chronic anemia and severe fatigue and daytime sleepiness, which has been present for years    Afebrile,   Soft, incisional tenderness  Hgb 6.6     Anemia is likely chronic + dilutional   Will transfuse 1 unit  Follow-up post-transfusion CBC  Medicine consult to set up PCP and establish follow-up plan

## 2019-09-13 LAB
ANION GAP SERPL CALC-SCNC: 11 MMOL/L — SIGNIFICANT CHANGE UP (ref 5–17)
ANISOCYTOSIS BLD QL: SIGNIFICANT CHANGE UP
BASOPHILS # BLD AUTO: 0 K/UL — SIGNIFICANT CHANGE UP (ref 0–0.2)
BASOPHILS NFR BLD AUTO: 0 % — SIGNIFICANT CHANGE UP (ref 0–2)
BUN SERPL-MCNC: 7 MG/DL — LOW (ref 8–20)
CALCIUM SERPL-MCNC: 8.1 MG/DL — LOW (ref 8.6–10.2)
CHLORIDE SERPL-SCNC: 104 MMOL/L — SIGNIFICANT CHANGE UP (ref 98–107)
CO2 SERPL-SCNC: 23 MMOL/L — SIGNIFICANT CHANGE UP (ref 22–29)
CREAT SERPL-MCNC: 0.46 MG/DL — LOW (ref 0.5–1.3)
EOSINOPHIL # BLD AUTO: 0 K/UL — SIGNIFICANT CHANGE UP (ref 0–0.5)
EOSINOPHIL NFR BLD AUTO: 0 % — SIGNIFICANT CHANGE UP (ref 0–6)
FERRITIN SERPL-MCNC: 29 NG/ML — SIGNIFICANT CHANGE UP (ref 15–150)
GIANT PLATELETS BLD QL SMEAR: PRESENT — SIGNIFICANT CHANGE UP
GLUCOSE SERPL-MCNC: 114 MG/DL — SIGNIFICANT CHANGE UP (ref 70–115)
HCT VFR BLD CALC: 25.6 % — LOW (ref 34.5–45)
HGB BLD-MCNC: 7.3 G/DL — LOW (ref 11.5–15.5)
HYPOCHROMIA BLD QL: SIGNIFICANT CHANGE UP
IRON SATN MFR SERPL: 14 UG/DL — LOW (ref 37–145)
IRON SATN MFR SERPL: 4 % — LOW (ref 14–50)
LYMPHOCYTES # BLD AUTO: 17.5 % — SIGNIFICANT CHANGE UP (ref 13–44)
LYMPHOCYTES # BLD AUTO: 2.69 K/UL — SIGNIFICANT CHANGE UP (ref 1–3.3)
MAGNESIUM SERPL-MCNC: 1.8 MG/DL — SIGNIFICANT CHANGE UP (ref 1.6–2.6)
MANUAL SMEAR VERIFICATION: SIGNIFICANT CHANGE UP
MCHC RBC-ENTMCNC: 17.7 PG — LOW (ref 27–34)
MCHC RBC-ENTMCNC: 28.5 GM/DL — LOW (ref 32–36)
MCV RBC AUTO: 62.1 FL — LOW (ref 80–100)
MICROCYTES BLD QL: SIGNIFICANT CHANGE UP
MONOCYTES # BLD AUTO: 1.89 K/UL — HIGH (ref 0–0.9)
MONOCYTES NFR BLD AUTO: 12.3 % — SIGNIFICANT CHANGE UP (ref 2–14)
NEUTROPHILS # BLD AUTO: 10.78 K/UL — HIGH (ref 1.8–7.4)
NEUTROPHILS NFR BLD AUTO: 70.2 % — SIGNIFICANT CHANGE UP (ref 43–77)
OVALOCYTES BLD QL SMEAR: SLIGHT — SIGNIFICANT CHANGE UP
PHOSPHATE SERPL-MCNC: 1.7 MG/DL — LOW (ref 2.4–4.7)
PLAT MORPH BLD: NORMAL — SIGNIFICANT CHANGE UP
PLATELET # BLD AUTO: 229 K/UL — SIGNIFICANT CHANGE UP (ref 150–400)
POIKILOCYTOSIS BLD QL AUTO: SLIGHT — SIGNIFICANT CHANGE UP
POLYCHROMASIA BLD QL SMEAR: SIGNIFICANT CHANGE UP
POTASSIUM SERPL-MCNC: 3.6 MMOL/L — SIGNIFICANT CHANGE UP (ref 3.5–5.3)
POTASSIUM SERPL-SCNC: 3.6 MMOL/L — SIGNIFICANT CHANGE UP (ref 3.5–5.3)
RBC # BLD: 4.12 M/UL — SIGNIFICANT CHANGE UP (ref 3.8–5.2)
RBC # FLD: 21 % — HIGH (ref 10.3–14.5)
RBC BLD AUTO: ABNORMAL
RETICS #: 49.6 K/UL — SIGNIFICANT CHANGE UP (ref 25–125)
RETICS/RBC NFR: 1.2 % — SIGNIFICANT CHANGE UP (ref 0.5–2.5)
SCHISTOCYTES BLD QL AUTO: SLIGHT — SIGNIFICANT CHANGE UP
SODIUM SERPL-SCNC: 138 MMOL/L — SIGNIFICANT CHANGE UP (ref 135–145)
TIBC SERPL-MCNC: 363 UG/DL — SIGNIFICANT CHANGE UP (ref 220–430)
TRANSFERRIN SERPL-MCNC: 254 MG/DL — SIGNIFICANT CHANGE UP (ref 192–382)
WBC # BLD: 15.35 K/UL — HIGH (ref 3.8–10.5)
WBC # FLD AUTO: 15.35 K/UL — HIGH (ref 3.8–10.5)

## 2019-09-13 PROCEDURE — 83020 HEMOGLOBIN ELECTROPHORESIS: CPT | Mod: 59

## 2019-09-13 PROCEDURE — 93970 EXTREMITY STUDY: CPT | Mod: 26

## 2019-09-13 PROCEDURE — 99221 1ST HOSP IP/OBS SF/LOW 40: CPT

## 2019-09-13 RX ORDER — SODIUM CHLORIDE 9 MG/ML
1000 INJECTION, SOLUTION INTRAVENOUS ONCE
Refills: 0 | Status: COMPLETED | OUTPATIENT
Start: 2019-09-13 | End: 2019-09-13

## 2019-09-13 RX ORDER — POTASSIUM PHOSPHATE, MONOBASIC POTASSIUM PHOSPHATE, DIBASIC 236; 224 MG/ML; MG/ML
30 INJECTION, SOLUTION INTRAVENOUS ONCE
Refills: 0 | Status: COMPLETED | OUTPATIENT
Start: 2019-09-13 | End: 2019-09-13

## 2019-09-13 RX ORDER — MAGNESIUM OXIDE 400 MG ORAL TABLET 241.3 MG
400 TABLET ORAL ONCE
Refills: 0 | Status: COMPLETED | OUTPATIENT
Start: 2019-09-13 | End: 2019-09-13

## 2019-09-13 RX ORDER — IRON SUCROSE 20 MG/ML
200 INJECTION, SOLUTION INTRAVENOUS ONCE
Refills: 0 | Status: COMPLETED | OUTPATIENT
Start: 2019-09-13 | End: 2019-09-13

## 2019-09-13 RX ORDER — ACETAMINOPHEN 500 MG
2 TABLET ORAL
Qty: 0 | Refills: 0 | DISCHARGE
Start: 2019-09-13

## 2019-09-13 RX ADMIN — HEPARIN SODIUM 5000 UNIT(S): 5000 INJECTION INTRAVENOUS; SUBCUTANEOUS at 21:33

## 2019-09-13 RX ADMIN — Medication 650 MILLIGRAM(S): at 17:59

## 2019-09-13 RX ADMIN — Medication 650 MILLIGRAM(S): at 06:21

## 2019-09-13 RX ADMIN — Medication 650 MILLIGRAM(S): at 23:33

## 2019-09-13 RX ADMIN — Medication 650 MILLIGRAM(S): at 12:46

## 2019-09-13 RX ADMIN — POTASSIUM PHOSPHATE, MONOBASIC POTASSIUM PHOSPHATE, DIBASIC 83.33 MILLIMOLE(S): 236; 224 INJECTION, SOLUTION INTRAVENOUS at 19:58

## 2019-09-13 RX ADMIN — SODIUM CHLORIDE 1000 MILLILITER(S): 9 INJECTION, SOLUTION INTRAVENOUS at 09:44

## 2019-09-13 RX ADMIN — CEFEPIME 100 MILLIGRAM(S): 1 INJECTION, POWDER, FOR SOLUTION INTRAMUSCULAR; INTRAVENOUS at 06:19

## 2019-09-13 RX ADMIN — Medication 650 MILLIGRAM(S): at 17:43

## 2019-09-13 RX ADMIN — HEPARIN SODIUM 5000 UNIT(S): 5000 INJECTION INTRAVENOUS; SUBCUTANEOUS at 06:19

## 2019-09-13 RX ADMIN — Medication 650 MILLIGRAM(S): at 23:31

## 2019-09-13 RX ADMIN — MAGNESIUM OXIDE 400 MG ORAL TABLET 400 MILLIGRAM(S): 241.3 TABLET ORAL at 12:46

## 2019-09-13 RX ADMIN — HEPARIN SODIUM 5000 UNIT(S): 5000 INJECTION INTRAVENOUS; SUBCUTANEOUS at 12:46

## 2019-09-13 RX ADMIN — IRON SUCROSE 110 MILLIGRAM(S): 20 INJECTION, SOLUTION INTRAVENOUS at 18:02

## 2019-09-13 RX ADMIN — Medication 650 MILLIGRAM(S): at 14:00

## 2019-09-13 NOTE — PROGRESS NOTE ADULT - ATTENDING COMMENTS
POD2 s/p lap appendectomy. Feels well, minimal pain, tolerating diet. Responded appropriately to 1 unit PRBC yesterday    Afebrile (Tm 100.1), tachycardic 100s-110s  Abdomen soft, minimal incisional tenderness    Will transfuse 1 more unit  Hematology consult  Phos repletion   Lower extremity DVT sono

## 2019-09-13 NOTE — CDI QUERY NOTE - NSCDIOTHERTXTBX2_GEN_ALL_CORE_FT
34 F with ct reviewed of  acute appendicitis possible concurrent cholecystitis who is now s/p lap appy 9/10.  POD2 s/p lap appendectomy. Feels well, minimal pain, tolerating diet. Responded appropriately to 1 unit PRBC yesterday. recieved 1 U PRBC today with appropriate response. hg now 7.6    Hemoglobin: 8.7 g/dL (09.11.19 @ 09:47)  Hemoglobin: 7.6 g/dL (09.12.19 @ 18:28)  Hemoglobin: 7.3 g/dL (09.13.19 @ 07:41)    Please clarify if the following is applicable     A) Acute blood loss anemia  B) Acute on chronic blood anemia  C) Chronic blood loss anemia  D) Other please clarify   E)  Clinically insignificant

## 2019-09-13 NOTE — CONSULT NOTE ADULT - SUBJECTIVE AND OBJECTIVE BOX
REASON FOR CONSULTATION - iron deficiency anemia    HPI:  35yo female presents to the ED with complaint of epigastric pain that radiated to her RUQ and RLQ. Pain started around 4am today, sharp, constant, no aggravating or alleviating factors. had some associated nausea with x3 emesis NBNB. Last BM was yesterday, had not eaten since x 24 hours. Currently, denies chest pain, SOB, fevers, chills, dysuria. (11 Sep 2019 14:31)    Diagnosed with appendicitis, now POD2 following lap appendectomy.  Admission CBC - hemoglobin 8.7, MCV 61, WBC 18.7, platelets 282.  Serum iron 14/TIBC 363, transferrin saturation 4%.    CT abdomen - appendicitis (pre-op)    Transfused 1 u PRBC post-op. Current hgb. 7.3 grams        REVIEW OF SYSTEMS:    CONSTITUTIONAL: fatigue  RESPIRATORY: No cough, wheezing, chills or hemoptysis; No shortness of breath  CARDIOVASCULAR: No chest pain, palpitations, dizziness,   GENITOURINARY: No dysuria, frequency, hematuria, or incontinence  NEUROLOGICAL: No headaches, memory loss, loss of strength, numbness, or tremors  SKIN: No itching, burning, rashes, or lesions   LYMPH NODES: No enlarged glands  ENDOCRINE: No heat or cold intolerance; No hair loss  MUSCULOSKELETAL: No joint pain or swelling; No muscle, back, or extremity pain  HEME/LYMPH: No easy bruising, or bleeding gums      PAST MEDICAL & SURGICAL HISTORY:  No pertinent past medical history  No significant past surgical history      SOCIAL HISTORY:    FAMILY HISTORY:  No pertinent family history in first degree relatives      SOCIAL HISTORY:    Allergies    No Known Allergies    Intolerances        MEDICATIONS  (STANDING):  acetaminophen   Tablet .. 650 milliGRAM(s) Oral every 6 hours  heparin  Injectable 5000 Unit(s) SubCutaneous every 8 hours  influenza   Vaccine 0.5 milliLiter(s) IntraMuscular once  iron sucrose IVPB 200 milliGRAM(s) IV Intermittent once  potassium phosphate IVPB 30 milliMole(s) IV Intermittent once    MEDICATIONS  (PRN):  oxyCODONE    IR 5 milliGRAM(s) Oral every 6 hours PRN Severe Pain (7 - 10)      Vital Signs Last 24 Hrs  T(C): 37.3 (13 Sep 2019 14:50), Max: 37.8 (13 Sep 2019 07:05)  T(F): 99.1 (13 Sep 2019 14:50), Max: 100.1 (13 Sep 2019 07:05)  HR: 105 (13 Sep 2019 15:21) (96 - 116)  BP: 104/68 (13 Sep 2019 15:21) (88/58 - 114/275)  BP(mean): --  RR: 18 (13 Sep 2019 15:21) (18 - 18)  SpO2: 95% (13 Sep 2019 15:21) (94% - 97%)    PHYSICAL EXAM:    GENERAL: pale, low-grade temp 99.1  HEAD:  Atraumatic, Normocephalic  EYES: EOMI, PERRLA, conjunctiva and sclera clear  NECK: Supple, No JVD, Normal thyroid  NERVOUS SYSTEM:  Alert & Oriented X3, Good concentration; Motor Strength 5/5 B/L upper and lower extremities; DTRs 2+ intact and symmetric  CHEST/LUNG: Clear to percussion bilaterally; No rales, rhonchi, wheezing, or rubs  HEART: Regular rate and rhythm; No murmurs, rubs, or gallops  ABDOMEN: S/P lap apprndectomy  EXTREMITIES:  2+ Peripheral Pulses, No clubbing, cyanosis, or edema  LYMPH: No lymphadenopathy noted  SKIN: No rashes or lesions      LABS:                        7.3    15.35 )-----------( 229      ( 13 Sep 2019 07:41 )             25.6     09-13    138  |  104  |  7.0<L>  ----------------------------<  114  3.6   |  23.0  |  0.46<L>    Ca    8.1<L>      13 Sep 2019 07:41  Phos  1.7     09-13  Mg     1.8     09-1
HPI:  33yo female presents to the ED with complaint of epigastric pain that radiated to her RUQ and RLQ. Pain started around 4am today, sharp, constant, no aggravating or alleviating factors. had some associated nausea with x3 emesis NBNB. Last BM was yesterday, has lost appetitis and not eaten since yesteray. Currently, denies chest pain, SOB, fevers, chills, dysuria. Post op appendectomy developed dilutional anemia to chronic anemia       PAST MEDICAL & SURGICAL HISTORY:  + Anemia  Appendectomy     acetaminophen   Tablet .. 650 milliGRAM(s) Oral every 6 hours  cefepime   IVPB 2000 milliGRAM(s) IV Intermittent every 12 hours  heparin  Injectable 5000 Unit(s) SubCutaneous every 8 hours  influenza   Vaccine 0.5 milliLiter(s) IntraMuscular once  ketorolac   Injectable 30 milliGRAM(s) IV Push every 8 hours  oxyCODONE    IR 5 milliGRAM(s) Oral every 6 hours PRN    MEDICATIONS  (STANDING):  acetaminophen   Tablet .. 650 milliGRAM(s) Oral every 6 hours  cefepime   IVPB 2000 milliGRAM(s) IV Intermittent every 12 hours  heparin  Injectable 5000 Unit(s) SubCutaneous every 8 hours  influenza   Vaccine 0.5 milliLiter(s) IntraMuscular once  ketorolac   Injectable 30 milliGRAM(s) IV Push every 8 hours    MEDICATIONS  (PRN):  oxyCODONE    IR 5 milliGRAM(s) Oral every 6 hours PRN Severe Pain (7 - 10)      Allergies    No Known Allergies    Intolerances        SOCIAL HISTORY:  NO S/D/IVDU    FAMILY HISTORY:  + DM   + Stomach Cancer   No Anemia     LABS:                        7.6    18.15 )-----------( 231      ( 12 Sep 2019 18:28 )             26.2         138  |  105  |  6.0<L>  ----------------------------<  136<H>  3.5   |  23.0  |  0.40<L>    Ca    8.5<L>      12 Sep 2019 06:37  Phos  2.4       Mg     1.8         TPro  7.9  /  Alb  4.5  /  TBili  0.4  /  DBili  x   /  AST  16  /  ALT  14  /  AlkPhos  67  -      Urinalysis Basic - ( 11 Sep 2019 10:11 )    Color: Yellow / Appearance: Clear / S.010 / pH: x  Gluc: x / Ketone: Trace  / Bili: Negative / Urobili: 1 mg/dL   Blood: x / Protein: 30 mg/dL / Nitrite: Negative   Leuk Esterase: Trace / RBC: 0-2 /HPF / WBC 3-5   Sq Epi: x / Non Sq Epi: Occasional / Bacteria: Negative        ROS  - Headache  - Neck Stiffness  - Chest Pain  - SOB  Mild incisional  Abd pain  - Pelvic Pain  - Leg Pain  - Blood in stool  Minimal Fatigue   - Normal menstral cycle       Vital Signs Last 24 Hrs  T(C): 36.7 (12 Sep 2019 16:35), Max: 37.1 (11 Sep 2019 23:23)  T(F): 98.1 (12 Sep 2019 16:35), Max: 98.8 (11 Sep 2019 23:23)  HR: 102 (12 Sep 2019 16:35) (95 - 116)  BP: 94/60 (12 Sep 2019 16:35) (89/56 - 116/70)  BP(mean): --  RR: 18 (12 Sep 2019 16:35) (14 - 19)  SpO2: 95% (12 Sep 2019 16:35) (92% - 99%)    HEENT: PEARLA  Neck: Supple  Cardio: S1 S2 No Murmur  Pulm: CTA No Rales or Ronchi  Abd: Soft NT ND BS+ Incisions clean NO RUQ tenderness  Rectal Pelvic Breast- refused  Ext: No DCT  Skin: No Rash  Neuro: Awake Pleasant    Chronic microcytic Anemia - S/P transfusion  suspicious for Thalassemia, verse Iron deficiency pt will call  Dr Ngoc Goff for outpt follow up once discharged should be seen within 2 weeks  Hyperglycemia -  mild stress induced +FM pt will concentrate on wt loss and low concentrated sweet diet

## 2019-09-13 NOTE — CONSULT NOTE ADULT - ASSESSMENT
33 y/o woman with iron deficiency anemia, now POD2 following lap appendectomy.  Plan - Venofer 200 mg daily IV while hospitalized.           Check serum ferritin, hemoglobin electropheresis (not suggestive of thal).            Following discharge, have patient follow at [facility] and we will treat with feraheme 510 mg weekly x 2.            GI and Gyn followup should be arranged as well post-discharge.

## 2019-09-13 NOTE — PROGRESS NOTE ADULT - SUBJECTIVE AND OBJECTIVE BOX
INTERVAL HPI/OVERNIGHT EVENTS/SUBJECTIVE:  POD 2 lap appendectomy. Pt seen and examined. pain well controlled. tolerating regular diet. still with high WBC. recieved 1 U PRBC today with appropriate response. hg now 7.6. HR still low 100's.  Denies cp, sob, n/v/d, f/c.    ICU Vital Signs Last 24 Hrs  T(C): 36.5 (12 Sep 2019 23:16), Max: 37.2 (12 Sep 2019 20:07)  T(F): 97.7 (12 Sep 2019 23:16), Max: 99 (12 Sep 2019 20:07)  HR: 104 (12 Sep 2019 23:16) (96 - 108)  BP: 88/58 (12 Sep 2019 23:16) (88/58 - 104/58)  BP(mean): --  ABP: --  ABP(mean): --  RR: 18 (12 Sep 2019 23:16) (18 - 19)  SpO2: 97% (12 Sep 2019 23:16) (92% - 97%)      I&O's Detail    11 Sep 2019 07:01  -  12 Sep 2019 07:00  --------------------------------------------------------  IN:    lactated ringers.: 50 mL    Oral Fluid: 240 mL  Total IN: 290 mL    OUT:    Voided: 725 mL  Total OUT: 725 mL    Total NET: -435 mL      12 Sep 2019 07:01  -  13 Sep 2019 02:01  --------------------------------------------------------  IN:    IV PiggyBack: 100 mL    Packed Red Blood Cells: 278 mL  Total IN: 378 mL    OUT:  Total OUT: 0 mL    Total NET: 378 mL    MEDICATIONS  (STANDING):  acetaminophen   Tablet .. 650 milliGRAM(s) Oral every 6 hours  cefepime   IVPB 2000 milliGRAM(s) IV Intermittent every 12 hours  heparin  Injectable 5000 Unit(s) SubCutaneous every 8 hours  influenza   Vaccine 0.5 milliLiter(s) IntraMuscular once    MEDICATIONS  (PRN):  oxyCODONE    IR 5 milliGRAM(s) Oral every 6 hours PRN Severe Pain (7 - 10)      MISC:     PHYSICAL EXAM:    Gen: NAD, laying comfortably  Neurological: AAOx3  Pulmonary: non-labored, no accessory muscle use  Cardiovascular: normal s1/s2  Gastrointestinal : appropriately TTp around incisions. no guarding or rebound. port sites c/d/i with dermabond. abdomen is soft.   Genitourinary: voiding independently  Extremities: no peripheral edema   Skin: skin is warm, dry, no rashes    LABS:  CBC Full  -  ( 12 Sep 2019 18:28 )  WBC Count : 18.15 K/uL  RBC Count : 4.18 M/uL  Hemoglobin : 7.6 g/dL  Hematocrit : 26.2 %  Platelet Count - Automated : 231 K/uL  Mean Cell Volume : 62.7 fl  Mean Cell Hemoglobin : 18.2 pg  Mean Cell Hemoglobin Concentration : 29.0 gm/dL  Auto Neutrophil # : x  Auto Lymphocyte # : x  Auto Monocyte # : x  Auto Eosinophil # : x  Auto Basophil # : x  Auto Neutrophil % : x  Auto Lymphocyte % : x  Auto Monocyte % : x  Auto Eosinophil % : x  Auto Basophil % : x        138  |  105  |  6.0<L>  ----------------------------<  136<H>  3.5   |  23.0  |  0.40<L>    Ca    8.5<L>      12 Sep 2019 06:37  Phos  2.4       Mg     1.8         TPro  7.9  /  Alb  4.5  /  TBili  0.4  /  DBili  x   /  AST  16  /  ALT  14  /  AlkPhos  67  09-11      Urinalysis Basic - ( 11 Sep 2019 10:11 )    Color: Yellow / Appearance: Clear / S.010 / pH: x  Gluc: x / Ketone: Trace  / Bili: Negative / Urobili: 1 mg/dL   Blood: x / Protein: 30 mg/dL / Nitrite: Negative   Leuk Esterase: Trace / RBC: 0-2 /HPF / WBC 3-5   Sq Epi: x / Non Sq Epi: Occasional / Bacteria: Negative    LIVER FUNCTIONS - ( 11 Sep 2019 09:47 )  Alb: 4.5 g/dL / Pro: 7.9 g/dL / ALK PHOS: 67 U/L / ALT: 14 U/L / AST: 16 U/L / GGT: x           ASSESSMENT/PLAN:  34yFemale presenting with: appendicitis. POD 2 laparoscopic appendectomy  --Continue Pain control  - DASH diet  - SQH  - AM labs  - SQH  -post transfusion cbc 7.6 from 6.6  -encourage oob , iss  -will d/w attending

## 2019-09-13 NOTE — CDI QUERY NOTE - NSCDIOTHERTXTBX_GEN_ALL_CORE_HH
34 F with ct reviewed of  acute appendicitis possible concurrent cholecystitis who is now s/p lap appy 9/10.    Following finds are noted      · Operative Findings	acutely inflamed appendix w/ surrounding inflammation and purulence    WBC Count: 18.74 K/uL (09.11.19 @ 09:47)  WBC Count: 19.18 K/uL (09.12.19 @ 08:28)   Heart Rate	 	120 /min 	    Antibiotics:   cefepime   IVPB   100 mL/Hr IV Intermittent (09-13-19 @ 06:19)   100 mL/Hr IV Intermittent (09-12-19 @ 17:26)    piperacillin/tazobactam IVPB.   200 mL/Hr IV Intermittent (09-11-19 @ 15:25)    Please clarify if applicable e     A) Early Sepsis POA, resolved?  B) Sepsis is resolving  C) Systemic Inflammatory Response Syndrome ( SIRS)   D) Other please clarify  E) Clinically insignificant

## 2019-09-13 NOTE — PROGRESS NOTE ADULT - SUBJECTIVE AND OBJECTIVE BOX
HPI:  33yo female presents to the ED with complaint of epigastric pain that radiated to her RUQ and RLQ. Pain started around 4am today, sharp, constant, no aggravating or alleviating factors. had some associated nausea with x3 emesis NBNB. Last BM was yesterday, has lost appetitis and not eaten since yesteray. Currently, denies chest pain, SOB, fevers, chills, dysuria. (11 Sep 2019 14:31)     Allergies    No Known Allergies    Intolerances      No pertinent past medical history    MEDICATIONS  (STANDING):  acetaminophen   Tablet .. 650 milliGRAM(s) Oral every 6 hours  heparin  Injectable 5000 Unit(s) SubCutaneous every 8 hours  influenza   Vaccine 0.5 milliLiter(s) IntraMuscular once  iron sucrose IVPB 200 milliGRAM(s) IV Intermittent once  potassium phosphate IVPB 30 milliMole(s) IV Intermittent once    MEDICATIONS  (PRN):  oxyCODONE    IR 5 milliGRAM(s) Oral every 6 hours PRN Severe Pain (7 - 10)                           7.3    15.35 )-----------( 229      ( 13 Sep 2019 07:41 )             25.6     09-13    138  |  104  |  7.0<L>  ----------------------------<  114  3.6   |  23.0  |  0.46<L>    Ca    8.1<L>      13 Sep 2019 07:41  Phos  1.7     09-13  Mg     1.8     09-13        ;  Vital Signs Last 24 Hrs  T(C): 37.3 (13 Sep 2019 14:50), Max: 37.8 (13 Sep 2019 07:05)  T(F): 99.1 (13 Sep 2019 14:50), Max: 100.1 (13 Sep 2019 07:05)  HR: 105 (13 Sep 2019 15:21) (96 - 116)  BP: 104/68 (13 Sep 2019 15:21) (88/58 - 114/275)  BP(mean): --  RR: 18 (13 Sep 2019 15:21) (18 - 18)  SpO2: 95% (13 Sep 2019 15:21) (94% - 97%)  CAPILLARY BLOOD GLUCOSE      09-12 @ 07:01  -  09-13 @ 07:00  --------------------------------------------------------  IN: 428 mL / OUT: 0 mL / NET: 428 mL    09-13 @ 07:01  - 09-13 @ 17:15  --------------------------------------------------------  IN: 1680 mL / OUT: 400 mL / NET: 1280 mL      Patient feeling better No CP, No SOB, No N/V Minimal Abd pain  HEENT: PEARLA  Neck: Supple  Cardio: S1 S2 No Murmur  Pulm: CTA No Rales or Ronchi  Abd: Soft NT ND BS+ Incisions clean NO RUQ tenderness  Rectal Pelvic Breast- refused  Ext: No DCT  Skin: No Rash  Neuro: Awake Pleasant    Chronic microcytic Anemia - transfusion indices suggestive of Iron deficiency more than Thalassemia, Venofer given   Hyperglycemia -  mild stress induced +Family History  pt will concentrate on wt loss and low concentrated sweet diet

## 2019-09-14 ENCOUNTER — TRANSCRIPTION ENCOUNTER (OUTPATIENT)
Age: 34
End: 2019-09-14

## 2019-09-14 VITALS
HEART RATE: 94 BPM | SYSTOLIC BLOOD PRESSURE: 108 MMHG | TEMPERATURE: 98 F | DIASTOLIC BLOOD PRESSURE: 73 MMHG | OXYGEN SATURATION: 98 % | RESPIRATION RATE: 18 BRPM

## 2019-09-14 LAB
ANION GAP SERPL CALC-SCNC: 12 MMOL/L — SIGNIFICANT CHANGE UP (ref 5–17)
BUN SERPL-MCNC: 4 MG/DL — LOW (ref 8–20)
CALCIUM SERPL-MCNC: 8.6 MG/DL — SIGNIFICANT CHANGE UP (ref 8.6–10.2)
CHLORIDE SERPL-SCNC: 102 MMOL/L — SIGNIFICANT CHANGE UP (ref 98–107)
CO2 SERPL-SCNC: 22 MMOL/L — SIGNIFICANT CHANGE UP (ref 22–29)
CREAT SERPL-MCNC: 0.35 MG/DL — LOW (ref 0.5–1.3)
GLUCOSE SERPL-MCNC: 91 MG/DL — SIGNIFICANT CHANGE UP (ref 70–115)
HCT VFR BLD CALC: 28 % — LOW (ref 34.5–45)
HGB BLD-MCNC: 8.3 G/DL — LOW (ref 11.5–15.5)
MAGNESIUM SERPL-MCNC: 2 MG/DL — SIGNIFICANT CHANGE UP (ref 1.8–2.6)
MCHC RBC-ENTMCNC: 18.7 PG — LOW (ref 27–34)
MCHC RBC-ENTMCNC: 29.6 GM/DL — LOW (ref 32–36)
MCV RBC AUTO: 63.1 FL — LOW (ref 80–100)
PHOSPHATE SERPL-MCNC: 4.1 MG/DL — SIGNIFICANT CHANGE UP (ref 2.4–4.7)
PLATELET # BLD AUTO: 237 K/UL — SIGNIFICANT CHANGE UP (ref 150–400)
POTASSIUM SERPL-MCNC: 3.8 MMOL/L — SIGNIFICANT CHANGE UP (ref 3.5–5.3)
POTASSIUM SERPL-SCNC: 3.8 MMOL/L — SIGNIFICANT CHANGE UP (ref 3.5–5.3)
RBC # BLD: 4.44 M/UL — SIGNIFICANT CHANGE UP (ref 3.8–5.2)
RBC # FLD: 23.2 % — HIGH (ref 10.3–14.5)
SODIUM SERPL-SCNC: 136 MMOL/L — SIGNIFICANT CHANGE UP (ref 135–145)
WBC # BLD: 8.81 K/UL — SIGNIFICANT CHANGE UP (ref 3.8–10.5)
WBC # FLD AUTO: 8.81 K/UL — SIGNIFICANT CHANGE UP (ref 3.8–10.5)

## 2019-09-14 PROCEDURE — 85045 AUTOMATED RETICULOCYTE COUNT: CPT

## 2019-09-14 PROCEDURE — 96361 HYDRATE IV INFUSION ADD-ON: CPT

## 2019-09-14 PROCEDURE — 83540 ASSAY OF IRON: CPT

## 2019-09-14 PROCEDURE — 93970 EXTREMITY STUDY: CPT

## 2019-09-14 PROCEDURE — 83735 ASSAY OF MAGNESIUM: CPT

## 2019-09-14 PROCEDURE — 85027 COMPLETE CBC AUTOMATED: CPT

## 2019-09-14 PROCEDURE — 88304 TISSUE EXAM BY PATHOLOGIST: CPT

## 2019-09-14 PROCEDURE — 80048 BASIC METABOLIC PNL TOTAL CA: CPT

## 2019-09-14 PROCEDURE — 96375 TX/PRO/DX INJ NEW DRUG ADDON: CPT | Mod: XU

## 2019-09-14 PROCEDURE — 36415 COLL VENOUS BLD VENIPUNCTURE: CPT

## 2019-09-14 PROCEDURE — T1013: CPT

## 2019-09-14 PROCEDURE — 74177 CT ABD & PELVIS W/CONTRAST: CPT

## 2019-09-14 PROCEDURE — 71045 X-RAY EXAM CHEST 1 VIEW: CPT

## 2019-09-14 PROCEDURE — 86923 COMPATIBILITY TEST ELECTRIC: CPT

## 2019-09-14 PROCEDURE — 83020 HEMOGLOBIN ELECTROPHORESIS: CPT

## 2019-09-14 PROCEDURE — 84466 ASSAY OF TRANSFERRIN: CPT

## 2019-09-14 PROCEDURE — 36430 TRANSFUSION BLD/BLD COMPNT: CPT

## 2019-09-14 PROCEDURE — 80053 COMPREHEN METABOLIC PANEL: CPT

## 2019-09-14 PROCEDURE — 76705 ECHO EXAM OF ABDOMEN: CPT

## 2019-09-14 PROCEDURE — 84702 CHORIONIC GONADOTROPIN TEST: CPT

## 2019-09-14 PROCEDURE — 81001 URINALYSIS AUTO W/SCOPE: CPT

## 2019-09-14 PROCEDURE — 84100 ASSAY OF PHOSPHORUS: CPT

## 2019-09-14 PROCEDURE — 86901 BLOOD TYPING SEROLOGIC RH(D): CPT

## 2019-09-14 PROCEDURE — 86900 BLOOD TYPING SEROLOGIC ABO: CPT

## 2019-09-14 PROCEDURE — 82728 ASSAY OF FERRITIN: CPT

## 2019-09-14 PROCEDURE — 86850 RBC ANTIBODY SCREEN: CPT

## 2019-09-14 PROCEDURE — 96374 THER/PROPH/DIAG INJ IV PUSH: CPT | Mod: XU

## 2019-09-14 PROCEDURE — 83690 ASSAY OF LIPASE: CPT

## 2019-09-14 PROCEDURE — 83550 IRON BINDING TEST: CPT

## 2019-09-14 PROCEDURE — P9016: CPT

## 2019-09-14 PROCEDURE — 93005 ELECTROCARDIOGRAM TRACING: CPT

## 2019-09-14 PROCEDURE — 99285 EMERGENCY DEPT VISIT HI MDM: CPT | Mod: 25

## 2019-09-14 RX ORDER — DOCUSATE SODIUM 100 MG
1 CAPSULE ORAL
Qty: 60 | Refills: 0
Start: 2019-09-14 | End: 2019-10-13

## 2019-09-14 RX ORDER — FERROUS SULFATE 325(65) MG
1 TABLET ORAL
Qty: 60 | Refills: 0
Start: 2019-09-14 | End: 2019-10-13

## 2019-09-14 RX ORDER — ACETAMINOPHEN 500 MG
2 TABLET ORAL
Qty: 0 | Refills: 0 | DISCHARGE
Start: 2019-09-14

## 2019-09-14 RX ADMIN — HEPARIN SODIUM 5000 UNIT(S): 5000 INJECTION INTRAVENOUS; SUBCUTANEOUS at 05:11

## 2019-09-14 RX ADMIN — OXYCODONE HYDROCHLORIDE 5 MILLIGRAM(S): 5 TABLET ORAL at 10:08

## 2019-09-14 RX ADMIN — Medication 650 MILLIGRAM(S): at 05:11

## 2019-09-14 RX ADMIN — OXYCODONE HYDROCHLORIDE 5 MILLIGRAM(S): 5 TABLET ORAL at 10:30

## 2019-09-14 RX ADMIN — Medication 650 MILLIGRAM(S): at 05:12

## 2019-09-14 NOTE — DISCHARGE NOTE NURSING/CASE MANAGEMENT/SOCIAL WORK - PATIENT PORTAL LINK FT
You can access the FollowMyHealth Patient Portal offered by [facility] by registering at the following website: [email or web address]. By joining [employer]’s FollowMyHealth portal, you will also be able to view your health information using other applications (apps) compatible with our system.

## 2019-09-14 NOTE — PROGRESS NOTE ADULT - SUBJECTIVE AND OBJECTIVE BOX
INTERVAL HPI/OVERNIGHT EVENTS:    SUBJECTIVE:  POD3 lap appy, gangrenous but not perforated appendicitis  Post-op tachycardia which is attributable to severe iron deficiency anemia, which is being worked up for alternative diagnoses as well (eg thalassemia)  patient responded finally to a total of 2 units PRBC, the latter of which was given yesterday afternoon;, This morning tachycardia is resolved below 100 PBM for the first time during her hospitalization  Pt has no complaints, abd pain is minimal, tolerating diet and feeling well enough to go home today  AM CBC pending      MEDICATIONS  (STANDING):  acetaminophen   Tablet .. 650 milliGRAM(s) Oral every 6 hours  heparin  Injectable 5000 Unit(s) SubCutaneous every 8 hours  influenza   Vaccine 0.5 milliLiter(s) IntraMuscular once    MEDICATIONS  (PRN):  oxyCODONE    IR 5 milliGRAM(s) Oral every 6 hours PRN Severe Pain (7 - 10)      Vital Signs Last 24 Hrs  T(C): 37.2 (14 Sep 2019 04:13), Max: 37.8 (13 Sep 2019 07:05)  T(F): 98.9 (14 Sep 2019 04:13), Max: 100.1 (13 Sep 2019 07:05)  HR: 94 (14 Sep 2019 04:13) (94 - 116)  BP: 117/74 (14 Sep 2019 04:13) (104/68 - 119/75)  BP(mean): --  RR: 18 (14 Sep 2019 04:13) (18 - 18)  SpO2: 100% (14 Sep 2019 04:13) (95% - 100%)    PE  Gen: NAD, laying comfortably  Neurological: AAOx3  Pulmonary: non-labored, no accessory muscle use  Cardiovascular: normal s1/s2  Gastrointestinal : appropriately TTP around incisions. no guarding or rebound. port sites c/d/i with dermabond. abdomen is obese and soft.   Genitourinary: voiding independently  Extremities: no peripheral edema   Skin: skin is warm, dry, no rashes      I&O's Detail    12 Sep 2019 07:01  -  13 Sep 2019 07:00  --------------------------------------------------------  IN:    Packed Red Blood Cells: 278 mL    Solution: 150 mL  Total IN: 428 mL    OUT:  Total OUT: 0 mL    Total NET: 428 mL      13 Sep 2019 07:01  -  14 Sep 2019 05:46  --------------------------------------------------------  IN:    multiple electrolytes Injection Type 1 Bolus: 1000 mL    Oral Fluid: 400 mL    Packed Red Blood Cells: 280 mL  Total IN: 1680 mL    OUT:    Voided: 402 mL  Total OUT: 402 mL    Total NET: 1278 mL          LABS:                        7.3    15.35 )-----------( 229      ( 13 Sep 2019 07:41 )             25.6     09-13    138  |  104  |  7.0<L>  ----------------------------<  114  3.6   |  23.0  |  0.46<L>    Ca    8.1<L>      13 Sep 2019 07:41  Phos  1.7     09-13  Mg     1.8     09-13            RADIOLOGY & ADDITIONAL STUDIES: INTERVAL HPI/OVERNIGHT EVENTS:    SUBJECTIVE:  POD3 lap appy, gangrenous but not perforated appendicitis  Post-op tachycardia which is attributable to severe iron deficiency anemia, which is being worked up for alternative diagnoses as well (eg thalassemia)  patient responded finally to a total of 2 units PRBC, the latter of which was given yesterday afternoon;, This morning tachycardia is resolved below 100 PBM for the first time during her hospitalization  Pt received 1 dose IV venofer yesterday without incident  Pt has no complaints, abd pain is minimal, tolerating diet and feeling well enough to go home today  AM CBC pending      MEDICATIONS  (STANDING):  acetaminophen   Tablet .. 650 milliGRAM(s) Oral every 6 hours  heparin  Injectable 5000 Unit(s) SubCutaneous every 8 hours  influenza   Vaccine 0.5 milliLiter(s) IntraMuscular once    MEDICATIONS  (PRN):  oxyCODONE    IR 5 milliGRAM(s) Oral every 6 hours PRN Severe Pain (7 - 10)      Vital Signs Last 24 Hrs  T(C): 37.2 (14 Sep 2019 04:13), Max: 37.8 (13 Sep 2019 07:05)  T(F): 98.9 (14 Sep 2019 04:13), Max: 100.1 (13 Sep 2019 07:05)  HR: 94 (14 Sep 2019 04:13) (94 - 116)  BP: 117/74 (14 Sep 2019 04:13) (104/68 - 119/75)  BP(mean): --  RR: 18 (14 Sep 2019 04:13) (18 - 18)  SpO2: 100% (14 Sep 2019 04:13) (95% - 100%)    PE  Gen: NAD, laying comfortably  Neurological: AAOx3  Pulmonary: non-labored, no accessory muscle use  Cardiovascular: normal s1/s2  Gastrointestinal : appropriately TTP around incisions. no guarding or rebound. port sites c/d/i with dermabond. abdomen is obese and soft.   Genitourinary: voiding independently  Extremities: no peripheral edema   Skin: skin is warm, dry, no rashes      I&O's Detail    12 Sep 2019 07:01  -  13 Sep 2019 07:00  --------------------------------------------------------  IN:    Packed Red Blood Cells: 278 mL    Solution: 150 mL  Total IN: 428 mL    OUT:  Total OUT: 0 mL    Total NET: 428 mL      13 Sep 2019 07:01  -  14 Sep 2019 05:46  --------------------------------------------------------  IN:    multiple electrolytes Injection Type 1 Bolus: 1000 mL    Oral Fluid: 400 mL    Packed Red Blood Cells: 280 mL  Total IN: 1680 mL    OUT:    Voided: 402 mL  Total OUT: 402 mL    Total NET: 1278 mL          LABS:                        7.3    15.35 )-----------( 229      ( 13 Sep 2019 07:41 )             25.6     09-13    138  |  104  |  7.0<L>  ----------------------------<  114  3.6   |  23.0  |  0.46<L>    Ca    8.1<L>      13 Sep 2019 07:41  Phos  1.7     09-13  Mg     1.8     09-13            RADIOLOGY & ADDITIONAL STUDIES:

## 2019-09-14 NOTE — PROGRESS NOTE ADULT - ASSESSMENT
34yFemale presenting with: appendicitis. POD 2 laparoscopic appendectomy, tachycardia 2/2 anemia has resolved this morning for first time responding to second unit PRBC transfusion yesterday afternoon    -Continue Pain control, minimize narcotics  - DASH diet  - SQH  - SQH  -post transfusion cbc  pending this AM  -encourage oob , iss  -If H/H responded appropriately and resolution of tachycardia is obtained, planning discharge home today  Follow up w Dr. Baldwin hematolgy as outpt for heme electrophoresis result and control of anemia  -Follow up with Dr. Ngoc Goff as outpt to establish PMD; patient has never been followed prior to this by doctor 34yFemale presenting with: appendicitis. POD 2 laparoscopic appendectomy, tachycardia 2/2 anemia has resolved this morning for first time responding to second unit PRBC transfusion yesterday afternoon    -Continue Pain control, minimize narcotics  - DASH diet  - SQH  - SQH  -post transfusion cbc  pending this AM  -encourage oob , iss  -  -If H/H responded appropriately and resolution of tachycardia is obtained, planning discharge home today  Follow up w Dr. Baldwin hematolgy as outpt for heme electrophoresis result and control of anemia  -Follow up with Dr. Ngoc Goff as outpt to establish PMD; patient has never been followed prior to this by doctor

## 2019-09-16 LAB
HEMOGLOBIN INTERPRETATION: SIGNIFICANT CHANGE UP
HEMOGLOBIN INTERPRETATION: SIGNIFICANT CHANGE UP
HGB A MFR BLD: 98 % — SIGNIFICANT CHANGE UP (ref 95.8–98)
HGB A MFR BLD: 98.1 % — HIGH (ref 95.8–98)
HGB A2 MFR BLD: 1.9 % — LOW (ref 2–3.2)
HGB A2 MFR BLD: 2 % — SIGNIFICANT CHANGE UP (ref 2–3.2)
SURGICAL PATHOLOGY STUDY: SIGNIFICANT CHANGE UP

## 2019-09-20 PROBLEM — Z78.9 OTHER SPECIFIED HEALTH STATUS: Chronic | Status: ACTIVE | Noted: 2019-09-11

## 2019-09-25 PROBLEM — Z00.00 ENCOUNTER FOR PREVENTIVE HEALTH EXAMINATION: Status: ACTIVE | Noted: 2019-09-25

## 2019-10-01 ENCOUNTER — APPOINTMENT (OUTPATIENT)
Dept: TRAUMA SURGERY | Facility: CLINIC | Age: 34
End: 2019-10-01

## 2022-01-06 NOTE — PRE-OP CHECKLIST - ASSESSMENT, HISTORY & PHYSICAL COMPLETED AND ON MEDICAL RECORD
done
Additional Notes: Patient consent was obtained to proceed with the visit and recommended plan of care after discussion of all risks and benefits, including the risks of COVID-19 exposure.
Detail Level: Simple

## 2022-03-15 NOTE — ED STATDOCS - CCCP TRG CHIEF CMPLNT
Hysterectomy Procedure Note     Name: Isabella Boykin MRN [id number]   YOB: 1973   Date: 3/15/2022   Time: 9:09 AM   Pre-operative Diagnosis: AUB, fibroids   Post-operatIve Diagnosis: same     Surgeon(s) and Role:     * Walter Paul D.O. - Primary     * Shaila Shepherd M.D. - Assisting     Anesthesiologist: Elver Isaac M.D.   Anesthesia: General     Procedure: Total Laparoscopic Hysterectomy, Bilateral Salpingectomy, Cystoscopy, Uterosacral ligament suspension, excision of left anterior broad ligament fibroid  Estimated Blood Loss: Minimal  Complications: none  Findings: fibroid uterus, normal appearing fallopian tubes and ovaries, anterior broad ligament fibroid on the left  Specimens:   ID Type Source Tests Collected by Time Destination   A : LEFT ANTERIOR BROAD LIGAMENT FIBROID Other Other PATHOLOGY SPECIMEN Walter Gibbons R.N. 3/15/2022  8:57 AM    B : UTERUS, BILATERAL FALLOPIAN TUBES Other Other PATHOLOGY SPECIMEN Walter Gibbons R.N. 3/15/2022  8:58 AM        Operation: Total Laparoscopic Hysterectomy, Bilateral Salpingectomy, Cystoscopy, Uterosacral ligament suspension, excision of left anterior broad ligament fibroid    Procedure Details:  The risks, benefits, and alternatives of the planned procedure were discussed with the patient, and informed consent was obtained prior to surgery.  The patient was then taken to the operating room with IV running.  Patient was identified and time out procedure was performed.  She was then laid on the operating room table and given general anesthesia, which was found to be adequate.  She was then situated in the dorsal lithotomy position in [facility] and then prepped and draped in the usual fashion for a laparoscopic procedure.  A Katz catheter was inserted into the bladder and allowed to drain to gravity.     Attention was first placed to the vagina.  A weighted speculum was placed in the vagina and the anterior lip of the cervix was grasped  with a tenaculum.  The uterus was sounded to 7 cm.  The cervix was then serially dilated.  A medium V-care was inserted into the cervix.  The 's gloves were then changed.  Attention was then turned to the abdomen.  An 11-mm vertical incision was then made using the scalpel at the umbilicus.  The fascia was identified, grasped with clamps and entered using a scalpel.  The peritoneum was bluntly entered.  The 11-mm Hasaan trocar was inserted and CO2 gas was used to insufflate the abdominal cavity.  The 10-mm 30 degrees laparoscope was inserted and the cavity was inspected.     Subsequently, 2 other 5 mm trocars were placed under direct visual guidance in the lower quadrants of the abdomen on the right and left sides.  Beginning on the left side, the fallopian tube was lifted towards the anterior abnominal wall to expose the mesosalpinx.  Working from distal to proximal, the mesosalpinx was sequentially, clamped, ligated, and cut using the Ligasure hugging adjacent to tube from distal to proximal in the direction of the cornua.  The utero-ovarian ligament was clamped, sealed, and cut.  The ovarian pedicle was the inspected to ensure there was no bleeding.     The left anterior broad ligament was excised using the ligasure.  This was removed with the uterus at the end of the case and sent to pathology.    Attention was then turned to the other side.  The same process was repeated on the right, sequentially clamping, ligating, and cutting the mesosalpinx being sure to not injure the adjacent ovarian tissue or other surrounding structures. The round ligament was then ligated and cut.  Following this, the anterior leaf of the broad ligament was then taken down dissecting down towards the peritoneal reflection at the base of the bladder and adjacent to the cervix.  The same process was then repeated on the other side.  Care was taken on both sides to avoid injuring the ovaries and to ensure the ureters were well  visualized bilaterally.     The uterine arteries were skeletonized on each side.  The uterine vessels were then ligated using Ligasure.  The paracervical tissues were serially ligated using Ligasure until reaching the level of the cervicovaginal junction.  An incision was then made over the V-Care, around the cervix using the monopolar hook blade.  The vaginal instruments along with the cervix, uterus, and bilateral tubes were removed through the vagina.  A glove with a laparotomy sponge was then placed in the vagina to maintain pneumoperitoneum.  The vaginal cuff was closed using a running, locked stitch 0 V-loc.  The uterosacral ligaments were incorporated into the closure bilaterally to ensure excellent apical support.  The abdomen was then copiously irrigated.  Hemostasis was assured.    The goldstein catheter was removed.  A 70 degree cystoscope was inserted into the bladder.  The bladder was distended and inspected.  Both ureteral orifices were visualized and both jets were seen.  The bladder was found to be free of injury.  The scope was removed and the distention fluid was drained.     All instruments were removed from the abdomen.  The glove was removed from the vagina.  The fascia was closed at the 10-mm trocar site using 0 vicryl.  The skin was closed using 4-0 monocryl.  Dkin incisions was closed using Dermabond at all trocar sites.  Sponge, needle, and instrument counts were correct times two.            Disposition: PACU - hemodynamically stable.  Condition: stable    Attending Attestation: I was present and scrubbed for the entire procedure.    Walter Paul D.O.   abdominal pain

## 2022-12-08 NOTE — ED STATDOCS - CARDIAC, MLM
normal rate, regular rhythm, and no murmur. Excisional Biopsy Additional Text (Leave Blank If You Do Not Want): The margin was drawn around the clinically apparent lesion. An elliptical shape was then drawn on the skin incorporating the lesion and margins.  Incisions were then made along these lines to the appropriate tissue plane and the lesion was extirpated.